# Patient Record
Sex: FEMALE | Race: WHITE | NOT HISPANIC OR LATINO | Employment: UNEMPLOYED | URBAN - METROPOLITAN AREA
[De-identification: names, ages, dates, MRNs, and addresses within clinical notes are randomized per-mention and may not be internally consistent; named-entity substitution may affect disease eponyms.]

---

## 2017-04-17 ENCOUNTER — OFFICE VISIT (OUTPATIENT)
Dept: URGENT CARE | Facility: CLINIC | Age: 57
End: 2017-04-17
Payer: COMMERCIAL

## 2017-04-17 VITALS
BODY MASS INDEX: 39.27 KG/M2 | HEIGHT: 64 IN | RESPIRATION RATE: 14 BRPM | TEMPERATURE: 98.8 F | DIASTOLIC BLOOD PRESSURE: 74 MMHG | WEIGHT: 230 LBS | SYSTOLIC BLOOD PRESSURE: 124 MMHG | OXYGEN SATURATION: 97 % | HEART RATE: 86 BPM

## 2017-04-17 DIAGNOSIS — R05.9 COUGH: ICD-10-CM

## 2017-04-17 DIAGNOSIS — J20.9 ACUTE BRONCHITIS, UNSPECIFIED ORGANISM: ICD-10-CM

## 2017-04-17 DIAGNOSIS — J01.40 ACUTE PANSINUSITIS, RECURRENCE NOT SPECIFIED: ICD-10-CM

## 2017-04-17 PROCEDURE — 94640 AIRWAY INHALATION TREATMENT: CPT | Performed by: NURSE PRACTITIONER

## 2017-04-17 PROCEDURE — 99214 OFFICE O/P EST MOD 30 MIN: CPT | Mod: 25 | Performed by: NURSE PRACTITIONER

## 2017-04-17 RX ORDER — BENZONATATE 100 MG/1
100 CAPSULE ORAL 3 TIMES DAILY PRN
Qty: 30 CAP | Refills: 0 | Status: SHIPPED | OUTPATIENT
Start: 2017-04-17 | End: 2019-11-16 | Stop reason: SDUPTHER

## 2017-04-17 RX ORDER — IPRATROPIUM BROMIDE AND ALBUTEROL SULFATE 2.5; .5 MG/3ML; MG/3ML
3 SOLUTION RESPIRATORY (INHALATION) ONCE
Status: COMPLETED | OUTPATIENT
Start: 2017-04-17 | End: 2017-04-17

## 2017-04-17 RX ORDER — ALBUTEROL SULFATE 2.5 MG/3ML
2.5 SOLUTION RESPIRATORY (INHALATION) EVERY 4 HOURS PRN
Qty: 75 ML | Refills: 0 | Status: SHIPPED | OUTPATIENT
Start: 2017-04-17

## 2017-04-17 RX ORDER — ALBUTEROL SULFATE 90 UG/1
2 AEROSOL, METERED RESPIRATORY (INHALATION) EVERY 6 HOURS PRN
Qty: 8.5 G | Refills: 0 | Status: SHIPPED | OUTPATIENT
Start: 2017-04-17

## 2017-04-17 RX ORDER — AZITHROMYCIN 250 MG/1
TABLET, FILM COATED ORAL
Qty: 6 TAB | Refills: 0 | Status: SHIPPED | OUTPATIENT
Start: 2017-04-17

## 2017-04-17 RX ADMIN — IPRATROPIUM BROMIDE AND ALBUTEROL SULFATE 3 ML: 2.5; .5 SOLUTION RESPIRATORY (INHALATION) at 12:45

## 2017-04-17 ASSESSMENT — ENCOUNTER SYMPTOMS
VOMITING: 0
MYALGIAS: 0
ORTHOPNEA: 0
PALPITATIONS: 0
SHORTNESS OF BREATH: 1
DIARRHEA: 0
CONSTIPATION: 0
CHILLS: 0
WEAKNESS: 0
EYE REDNESS: 0
NECK PAIN: 0
SENSORY CHANGE: 0
SORE THROAT: 0
NAUSEA: 0
HEADACHES: 0
BACK PAIN: 0
WHEEZING: 0
EYE DISCHARGE: 0
TINGLING: 0
SPUTUM PRODUCTION: 0
FEVER: 0
DIZZINESS: 0
ABDOMINAL PAIN: 0
COUGH: 1

## 2017-04-17 NOTE — PROGRESS NOTES
"Subjective:      Marina Medina is a 57 y.o. female who presents with URI            URI   Associated symptoms include congestion, coughing and ear pain. Pertinent negatives include no abdominal pain, chest pain, diarrhea, headaches, nausea, neck pain, sore throat, vomiting or wheezing.   Marina is a 57 year old female who is here for sinus problems and cough x 2 months. C/o yellow nasal d/c, ear pressure, low grade fever. Using flonase 2x/day, no saline rinsing, allegra use. C/o dry scratchy throat, cough dry hacking but productive intermittent, no SOB but slight chest tightness/minimal wheeze, states asthma history. Using Nyquil. Smoker. No cough syrup use. Albuterol inhaler at home 1x/day, not \"really having to use it\", has nebulizer but has not used, no meds for it. Will get intermittent dizziness, runny nose and PND.    PMH:  has a past medical history of Diabetes; ASTHMA; Hypertension; Other and unspecified hyperlipidemia (3/31/2014); Essential hypertension, benign (3/31/2014); and Gastroparesis (9/30/2015).  MEDS:   Current outpatient prescriptions:   •  albuterol 108 (90 BASE) MCG/ACT Aero Soln inhalation aerosol, Inhale 2 Puffs by mouth every 6 hours as needed for Shortness of Breath., Disp: 8.5 g, Rfl: 0  •  albuterol (PROVENTIL) 2.5mg/3ml Nebu Soln solution for nebulization, 3 mL by Nebulization route every four hours as needed for Shortness of Breath., Disp: 75 mL, Rfl: 0  •  azithromycin (ZITHROMAX) 250 MG Tab, Take 2 tabs by mouth on day 1, then take 1 tab on days 2-5, Disp: 6 Tab, Rfl: 0  •  benzonatate (TESSALON) 100 MG Cap, Take 1 Cap by mouth 3 times a day as needed for Cough., Disp: 30 Cap, Rfl: 0  •  Hydrocod Polst-CPM Polst ER (TUSSIONEX) 10-8 MG/5ML Suspension Extended Release, Take 5 mL by mouth every 12 hours as needed for Cough. Causes drowsiness, do not drive or work while using, Disp: 120 mL, Rfl: 0  •  triamterene-hctz (MAXZIDE-25/DYAZIDE) 37.5-25 MG Tab, Take 1 Tab by mouth every day., " "Disp: 90 Tab, Rfl: 2  •  metformin (GLUCOPHAGE) 500 MG Tab, Take 2 Tabs by mouth every day., Disp: 180 Tab, Rfl: 3  •  Canagliflozin (INVOKANA) 100 MG TABS, Take 1 Tab by mouth every day., Disp: 30 Tab, Rfl: 11  •  losartan (COZAAR) 100 MG TABS, Take 100 mg by mouth every day., Disp: , Rfl:   •  rosuvastatin (CRESTOR) 20 MG TABS, Take 20 mg by mouth every evening.  , Disp: , Rfl:   •  potassium chloride CR (K-DUR) 10 MEQ tablet, Take 10 mEq by mouth every day., Disp: , Rfl:   •  Esomeprazole Magnesium (NEXIUM) 20 MG PACK, Take 40 mg by mouth 2 Times a Day., Disp: , Rfl:   •  vitamin D (CHOLECALCIFEROL) 1000 UNIT TABS, Take 2,000 Units by mouth every day., Disp: , Rfl:   •  AMLODIPINE BESYLATE PO, Take 10 mg by mouth., Disp: , Rfl:   •  insulin detemir (LEVEMIR FLEXTOUCH) 100 UNIT/ML Solution Pen-injector injection, Inject 80 Units as instructed every evening., Disp: 30 mL, Rfl: 11  •  Saxagliptin-Metformin (KOMBIGLYZE XR) 5-1000 MG TB24, Take 1 Tab by mouth every day., Disp: 90 Tab, Rfl: 3  •  glimepiride (AMARYL) 4 MG TABS, Take 1 Tab by mouth 2 times a day., Disp: 180 Tab, Rfl: 3  •  glucose blood (FREESTYLE LITE) strip, Testing blood sugar 3 times per day., Disp: 100 Strip, Rfl: 6  •  INJECTION DEVICE FOR INSULIN (PEN NEEDLES 31GX5/16\") MISC, 1 Each by Does not apply route every bedtime., Disp: 100 Each, Rfl: 3  •  metoclopramide (REGLAN) 10 MG TABS, Take 10 mg by mouth 4 times a day. 1 tablet before meals and at bedtime , Disp: , Rfl:   •  Choline Fenofibrate (TRILIPIX) 135 MG CPDR, Take  by mouth.  , Disp: , Rfl:   •  albuterol (VENTOLIN OR PROVENTIL) 108 (90 BASE) MCG/ACT AERS, Inhale 2 Puffs by mouth every 6 hours as needed.  , Disp: , Rfl:   •  Pseudoeph-Doxylamine-DM-APAP (NYQUIL PO), Take  by mouth., Disp: , Rfl:   ALLERGIES:   Allergies   Allergen Reactions   • Asa [Aspirin] Hives   • Lovaza [Omega-3-Acid Ethyl Esters] Hives     SURGHX:   Past Surgical History   Procedure Laterality Date   • Other " "orthopedic surgery     • Gyn surgery       SOCHX:  reports that she has been smoking.  She has never used smokeless tobacco. She reports that she drinks alcohol. She reports that she does not use illicit drugs.  FH: Family history was reviewed, no pertinent findings to report      Review of Systems   Constitutional: Negative for fever, chills and malaise/fatigue.   HENT: Positive for congestion and ear pain. Negative for sore throat.    Eyes: Negative for discharge and redness.   Respiratory: Positive for cough and shortness of breath. Negative for sputum production and wheezing.    Cardiovascular: Negative for chest pain, palpitations and orthopnea.   Gastrointestinal: Negative for nausea, vomiting, abdominal pain, diarrhea and constipation.   Musculoskeletal: Negative for myalgias, back pain and neck pain.   Neurological: Negative for dizziness, tingling, sensory change, weakness and headaches.   Endo/Heme/Allergies: Positive for environmental allergies.   All other systems reviewed and are negative.         Objective:     /74 mmHg  Pulse 86  Temp(Src) 37.1 °C (98.8 °F)  Resp 14  Ht 1.626 m (5' 4\")  Wt 104.327 kg (230 lb)  BMI 39.46 kg/m2  SpO2 97%     Physical Exam   Constitutional: She is oriented to person, place, and time. Vital signs are normal. She appears well-developed and well-nourished.  Non-toxic appearance. She does not have a sickly appearance. She appears ill. No distress.   HENT:   Head: Normocephalic.   Right Ear: External ear and ear canal normal. Tympanic membrane is not bulging. A middle ear effusion is present.   Left Ear: External ear and ear canal normal. Tympanic membrane is not bulging. A middle ear effusion is present.   Nose: Mucosal edema and sinus tenderness present. No rhinorrhea. Right sinus exhibits maxillary sinus tenderness and frontal sinus tenderness. Left sinus exhibits maxillary sinus tenderness and frontal sinus tenderness.   Mouth/Throat: Uvula is midline. Mucous " membranes are dry. No uvula swelling. Posterior oropharyngeal erythema present. No posterior oropharyngeal edema.   Eyes: Conjunctivae and EOM are normal. Pupils are equal, round, and reactive to light.   Neck: Normal range of motion. Neck supple.   Cardiovascular: Normal rate and regular rhythm.    Pulmonary/Chest: Effort normal and breath sounds normal. No accessory muscle usage. No respiratory distress. She has no decreased breath sounds. She has no wheezes. She has no rhonchi. She has no rales.   Musculoskeletal: Normal range of motion.   Lymphadenopathy:     She has no cervical adenopathy.   Neurological: She is alert and oriented to person, place, and time.   Skin: Skin is warm and dry. She is not diaphoretic.   Vitals reviewed.  Duo Neb breathing treatment: Patient has chest tightness, wheeze without SOB or CP. Cough induced especially with deep breathing. After, patient states able to breathe deep without coughing. Air movement throughout. Post tx%.    [employer] web site evaluation: I have obtained and reviewed patient utilization report from [facility] pharmacy database prior to writing prescription for controlled substance II, III or IV. Based on the report and my clinical assessment the prescription is medically necessary, last narcotic fill on 2/20/17 for Tussionex 120 ml.          Assessment/Plan:     1. Cough    - albuterol 108 (90 BASE) MCG/ACT Aero Soln inhalation aerosol; Inhale 2 Puffs by mouth every 6 hours as needed for Shortness of Breath.  Dispense: 8.5 g; Refill: 0  - albuterol (PROVENTIL) 2.5mg/3ml Nebu Soln solution for nebulization; 3 mL by Nebulization route every four hours as needed for Shortness of Breath.  Dispense: 75 mL; Refill: 0  - benzonatate (TESSALON) 100 MG Cap; Take 1 Cap by mouth 3 times a day as needed for Cough.  Dispense: 30 Cap; Refill: 0  - Hydrocod Polst-CPM Polst ER (TUSSIONEX) 10-8 MG/5ML Suspension Extended Release; Take 5 mL by mouth every 12 hours as needed for  Cough. Causes drowsiness, do not drive or work while using  Dispense: 120 mL; Refill: 0  - ipratropium-albuterol (DUONEB) nebulizer solution 3 mL; 3 mL by Nebulization route Once.    2. Acute bronchitis, unspecified organism    - albuterol 108 (90 BASE) MCG/ACT Aero Soln inhalation aerosol; Inhale 2 Puffs by mouth every 6 hours as needed for Shortness of Breath.  Dispense: 8.5 g; Refill: 0  - albuterol (PROVENTIL) 2.5mg/3ml Nebu Soln solution for nebulization; 3 mL by Nebulization route every four hours as needed for Shortness of Breath.  Dispense: 75 mL; Refill: 0  - azithromycin (ZITHROMAX) 250 MG Tab; Take 2 tabs by mouth on day 1, then take 1 tab on days 2-5  Dispense: 6 Tab; Refill: 0  - benzonatate (TESSALON) 100 MG Cap; Take 1 Cap by mouth 3 times a day as needed for Cough.  Dispense: 30 Cap; Refill: 0  - ipratropium-albuterol (DUONEB) nebulizer solution 3 mL; 3 mL by Nebulization route Once.    3. Acute pansinusitis, recurrence not specified    - azithromycin (ZITHROMAX) 250 MG Tab; Take 2 tabs by mouth on day 1, then take 1 tab on days 2-5  Dispense: 6 Tab; Refill: 0    Increase water intake  Get rest  May use Ibuprofen/Tylenol prn for any fever, body aches or throat pain  May take longer acting antihistamine for seasonal allergy symptoms prn  May use saline nasal spray for nasal congestion  May use Flonase or Nasocort for allergen nasal congestion  May use Mucinex prn for increased productive cough   May gargle with salt water prn for throat discomfort  May drink smoothies for nutrition if too painful to swallow solid foods  Monitor for productive cough, SOB and chest pain/tightness- need re-evaluation

## 2018-10-28 NOTE — MR AVS SNAPSHOT
"        Marina Medina   2017 12:15 PM   Office Visit   MRN: [id number]    Department:  [facility]   Dept Phone:  [phone]    Description:  Female : 1960   Provider:  PREET Tracey           Reason for Visit     URI uri , sinus congestion , l ear ache  , dizziness x 2 weeks .      Allergies as of 2017     Allergen Noted Reactions    Asa [Aspirin] 10/17/2009   Hives    Lovaza [Omega-3-Acid Ethyl Esters] 2014   Hives      You were diagnosed with     Cough   [786.2.ICD-9-CM]       Acute bronchitis, unspecified organism   [2055336]       Acute pansinusitis, recurrence not specified   [4112937]         Vital Signs     Blood Pressure Pulse Temperature Respirations Height Weight    124/74 mmHg 86 37.1 °C (98.8 °F) 14 1.626 m (5' 4\") 104.327 kg (230 lb)    Body Mass Index Oxygen Saturation Smoking Status             39.46 kg/m2 97% Current Every Day Smoker         Basic Information     Date Of Birth Sex Race Ethnicity Preferred Language    1960 Female White Non- English      Problem List              ICD-10-CM Priority Class Noted - Resolved    Type II or unspecified type diabetes mellitus without mention of complication, uncontrolled E11.65   3/31/2014 - Present    Other and unspecified hyperlipidemia E78.5   3/31/2014 - Present    Essential hypertension, benign I10   3/31/2014 - Present    Encounter for long-term (current) use of insulin (CMS-[facility]) Z79.4   10/1/2014 - Present    Insomnia G47.00   10/1/2014 - Present    Gastroparesis K31.84   2015 - Present      Health Maintenance        Date Due Completion Dates    IMM HEP B VACCINE (1 of 3 - Primary Series) 1960 ---    URINE ACR / MICROALBUMIN 1979 ---    IMM DTaP/Tdap/Td Vaccine (1 - Tdap) 1980 ---    IMM PNEUMOCOCCAL 19-64 (ADULT) MEDIUM RISK SERIES (1 of 1 - PPSV23) 1980 ---    PAP SMEAR 1982 ---    MAMMOGRAM 2001 ---    COLONOSCOPY 2011 ---    A1C SCREENING 2016 " 7/14/2015, 2/18/2015, 8/18/2014, 3/31/2014, 4/24/2013, 10/17/2009    RETINAL SCREENING 5/28/2016 5/28/2015    FASTING LIPID PROFILE 7/14/2016 7/14/2015, 2/18/2015, 8/18/2014, 4/24/2013, 10/17/2009    SERUM CREATININE 7/14/2016 7/14/2015, 2/18/2015, 8/18/2014, 4/24/2013, 10/17/2009, 11/27/2006, 1/11/2005, 1/10/2005    DIABETES MONOFILAMENT / LE EXAM 9/30/2016 9/30/2015 (Done), 4/1/2015 (Done), 3/31/2014, 3/31/2014 (Done)    Override on 9/30/2015: Done    Override on 4/1/2015: Done    Override on 3/31/2014: Done            Current Immunizations     Influenza Vaccine Quad Inj (Pf) 10/1/2014      Below and/or attached are the medications your provider expects you to take. Review all of your home medications and newly ordered medications with your provider and/or pharmacist. Follow medication instructions as directed by your provider and/or pharmacist. Please keep your medication list with you and share with your provider. Update the information when medications are discontinued, doses are changed, or new medications (including over-the-counter products) are added; and carry medication information at all times in the event of emergency situations     Allergies:  ASA - Hives     LOVAZA - Hives               Medications  Valid as of: April 17, 2017 -  1:08 PM    Generic Name Brand Name Tablet Size Instructions for use    Albuterol Sulfate (Aero Soln) albuterol 108 (90 BASE) MCG/ACT Inhale 2 Puffs by mouth every 6 hours as needed.          Albuterol Sulfate (Aero Soln) albuterol 108 (90 BASE) MCG/ACT Inhale 2 Puffs by mouth every 6 hours as needed for Shortness of Breath.        Albuterol Sulfate (Nebu Soln) PROVENTIL 2.5mg/3ml 3 mL by Nebulization route every four hours as needed for Shortness of Breath.        AmLODIPine Besylate   Take 10 mg by mouth.        Azithromycin (Tab) ZITHROMAX 250 MG Take 2 tabs by mouth on day 1, then take 1 tab on days 2-5        Benzonatate (Cap) TESSALON 100 MG Take 1 Cap by mouth 3 times a  day as needed for Cough.        Canagliflozin (Tab) Canagliflozin 100 MG Take 1 Tab by mouth every day.        Cholecalciferol (Tab) cholecalciferol 1000 UNIT Take 2,000 Units by mouth every day.        Choline Fenofibrate (CAPSULE DELAYED RELEASE) TRILIPIX 135 MG Take  by mouth.          Esomeprazole Magnesium (Pack) Esomeprazole Magnesium 20 MG Take 40 mg by mouth 2 Times a Day.        Glimepiride (Tab) AMARYL 4 MG Take 1 Tab by mouth 2 times a day.        Glucose Blood (Strip) glucose blood  Testing blood sugar 3 times per day.        Hydrocod Polst-Chlorphen Polst (Suspension Extended Release) TUSSIONEX 10-8 MG/5ML Take 5 mL by mouth every 12 hours as needed for Cough. Causes drowsiness, do not drive or work while using        Injection Device for Insulin (Misc) INJECTION DEVICE FOR INSULIN  1 Each by Does not apply route every bedtime.        Insulin Detemir (Solution Pen-injector) LEVEMIR 100 UNIT/ML Inject 80 Units as instructed every evening.        Losartan Potassium (Tab) COZAAR 100 MG Take 100 mg by mouth every day.        MetFORMIN HCl (Tab) GLUCOPHAGE 500 MG Take 2 Tabs by mouth every day.        Metoclopramide HCl (Tab) REGLAN 10 MG Take 10 mg by mouth 4 times a day. 1 tablet before meals and at bedtime         Potassium Chloride (Tab CR) KLOR-CON 10 MEQ Take 10 mEq by mouth every day.        Pseudoeph-Doxylamine-DM-APAP   Take  by mouth.        Rosuvastatin Calcium (Tab) CRESTOR 20 MG Take 20 mg by mouth every evening.          SAXagliptin-MetFORMIN (TABLET SR 24 HR) Saxagliptin-Metformin 5-1000 MG Take 1 Tab by mouth every day.        Triamterene-HCTZ (Tab) MAXZIDE-25/DYAZIDE 37.5-25 MG Take 1 Tab by mouth every day.        .                 Medicines prescribed today were sent to:     [facility]/PHARM[facility]Y #[id number] - [place] - [address]    [address] [place] [address]    Phone: [phone] Fax: [phone]    Open 24 Hours?: No      Medication refill instructions:       If your prescription  bottle indicates you have medication refills left, it is not necessary to call your provider’s office. Please contact your pharmacy and they will refill your medication.    If your prescription bottle indicates you do not have any refills left, you may request refills at any time through one of the following ways: The online [employer] system (except Urgent Care), by calling your provider’s office, or by asking your pharmacy to contact your provider’s office with a refill request. Medication refills are processed only during regular business hours and may not be available until the next business day. Your provider may request additional information or to have a follow-up visit with you prior to refilling your medication.   *Please Note: Medication refills are assigned a new Rx number when refilled electronically. Your pharmacy may indicate that no refills were authorized even though a new prescription for the same medication is available at the pharmacy. Please request the medicine by name with the pharmacy before contacting your provider for a refill.           [employer] Access Code: Activation code not generated  Current [employer] Status: Active          Quit Tobacco Information     Do you want to quit using tobacco?    Quitting tobacco decreases risks of cancer, heart and lung disease, increases life expectancy, improves sense of taste and smell, and increases spending money, among other benefits.    If you are thinking about quitting, we can help.  • [facility] Quit Tobacco Program: [phone]  o Program occurs weekly for four weeks and includes pharmacist consultation on products to support quitting smoking or chewing tobacco. A provider referral is needed for pharmacist consultation.  • Tobacco Users Help Hotline: 7-537-QUIT-NOW (127-1225) or https://[place].quitlogix.org/  o Free, confidential telephone and online coaching for [place] residents. Sessions are designed on a schedule that is convenient for you. Eligible  clients receive free nicotine replacement therapy.  • Nationally: www.smokefree.gov  o Information and professional assistance to support both immediate and long-term needs as you become, and remain, a non-smoker. Smokefree.gov allows you to choose the help that best fits your needs.         normal...

## 2018-11-28 ENCOUNTER — OFFICE VISIT (OUTPATIENT)
Dept: URGENT CARE | Facility: CLINIC | Age: 58
End: 2018-11-28
Payer: COMMERCIAL

## 2018-11-28 VITALS
HEIGHT: 64 IN | SYSTOLIC BLOOD PRESSURE: 132 MMHG | HEART RATE: 74 BPM | OXYGEN SATURATION: 96 % | WEIGHT: 230 LBS | TEMPERATURE: 97.3 F | DIASTOLIC BLOOD PRESSURE: 84 MMHG | BODY MASS INDEX: 39.27 KG/M2 | RESPIRATION RATE: 13 BRPM

## 2018-11-28 DIAGNOSIS — D49.2 ABNORMAL SKIN GROWTH: ICD-10-CM

## 2018-11-28 DIAGNOSIS — L08.9 SKIN INFECTION: ICD-10-CM

## 2018-11-28 DIAGNOSIS — J40 BRONCHITIS: ICD-10-CM

## 2018-11-28 PROCEDURE — 99214 OFFICE O/P EST MOD 30 MIN: CPT | Performed by: PHYSICIAN ASSISTANT

## 2018-11-28 RX ORDER — LISINOPRIL 10 MG/1
10 TABLET ORAL DAILY
COMMUNITY

## 2018-11-28 RX ORDER — TRAZODONE HYDROCHLORIDE 100 MG/1
100 TABLET ORAL NIGHTLY
COMMUNITY

## 2018-11-28 RX ORDER — CODEINE PHOSPHATE AND GUAIFENESIN 10; 100 MG/5ML; MG/5ML
5 SOLUTION ORAL NIGHTLY PRN
Qty: 50 ML | Refills: 0 | Status: SHIPPED | OUTPATIENT
Start: 2018-11-28 | End: 2018-12-08

## 2018-11-28 RX ORDER — DOXYCYCLINE 100 MG/1
100 CAPSULE ORAL 2 TIMES DAILY
Qty: 10 CAP | Refills: 0 | Status: SHIPPED | OUTPATIENT
Start: 2018-11-28 | End: 2018-12-03

## 2018-11-29 ASSESSMENT — ENCOUNTER SYMPTOMS
SHORTNESS OF BREATH: 0
WHEEZING: 0
HEADACHES: 0
CHILLS: 0
RHINORRHEA: 1
DIZZINESS: 0
VOMITING: 0
FEVER: 0
TINGLING: 0
DIARRHEA: 0
MYALGIAS: 0
EYE REDNESS: 0
EYE DISCHARGE: 0
COUGH: 1
SPUTUM PRODUCTION: 1
NECK PAIN: 0
SORE THROAT: 1

## 2018-11-29 NOTE — PROGRESS NOTES
"Subjective:      Marina Medina is a 58 y.o. female who presents with Nodule (vaginal area) and Cough            Patient is a 58-year-old female who presents to urgent care with a recent cough and congestion over the last few weeks.  Patient also would like for me to look at to skin lesions near her vagina one has progressively become more painful and she believes may be an infection.  The other skin lesion is a \"skin tag\" of which her primary care provider per patient is too large for him to remove.  Patient has long history of same in the past with other \"skin tags\" of which have been removed without difficulty.  The sore lesion developed a few days ago of which she is concerned about a possible boil.  Patient reports that it is very painful when she wipes as well however she is uncertain if the area has been draining although does feel wet at times.      Cough   This is a new problem. The current episode started 1 to 4 weeks ago. The problem has been unchanged. The problem occurs every few minutes. The cough is productive of sputum. Associated symptoms include ear congestion, postnasal drip, rhinorrhea and a sore throat. Pertinent negatives include no chest pain, chills, ear pain, eye redness, fever, headaches, myalgias, rash, shortness of breath or wheezing. Nothing aggravates the symptoms. She has tried OTC cough suppressant for the symptoms. The treatment provided mild relief. Her past medical history is significant for bronchitis.       Review of Systems   Constitutional: Positive for malaise/fatigue. Negative for chills and fever.   HENT: Positive for congestion, postnasal drip, rhinorrhea and sore throat. Negative for ear discharge and ear pain.    Eyes: Negative for discharge and redness.   Respiratory: Positive for cough and sputum production. Negative for shortness of breath and wheezing.    Cardiovascular: Negative for chest pain.   Gastrointestinal: Negative for diarrhea and vomiting. " "  Genitourinary: Negative for dysuria and urgency.   Musculoskeletal: Negative for myalgias and neck pain.   Skin: Negative for itching and rash.   Neurological: Negative for dizziness, tingling and headaches.   All other systems reviewed and are negative.         Objective:     /84 (BP Location: Right arm, Patient Position: Sitting, BP Cuff Size: Adult)   Pulse 74   Temp 36.3 °C (97.3 °F) (Temporal)   Resp 13   Ht 1.626 m (5' 4\")   Wt 104.3 kg (230 lb)   SpO2 96%   BMI 39.48 kg/m²    PMH:  has a past medical history of ASTHMA; Diabetes; Essential hypertension, benign (3/31/2014); Gastroparesis (9/30/2015); Hypertension; and Other and unspecified hyperlipidemia (3/31/2014).  MEDS:   Current Outpatient Prescriptions:   •  lisinopril (PRINIVIL) 10 MG Tab, Take 10 mg by mouth every day., Disp: , Rfl:   •  traZODone (DESYREL) 100 MG Tab, Take 100 mg by mouth every evening., Disp: , Rfl:   •  doxycycline (MONODOX) 100 MG capsule, Take 1 Cap by mouth 2 times a day for 5 days., Disp: 10 Cap, Rfl: 0  •  guaifenesin-codeine (ROBITUSSIN AC) Solution oral solution, Take 5 mL by mouth at bedtime as needed for Cough (May cause sedation) for up to 10 days., Disp: 50 mL, Rfl: 0  •  albuterol (PROVENTIL) 2.5mg/3ml Nebu Soln solution for nebulization, 3 mL by Nebulization route every four hours as needed for Shortness of Breath., Disp: 75 mL, Rfl: 0  •  metformin (GLUCOPHAGE) 500 MG Tab, Take 2 Tabs by mouth every day., Disp: 180 Tab, Rfl: 3  •  glimepiride (AMARYL) 4 MG TABS, Take 1 Tab by mouth 2 times a day., Disp: 180 Tab, Rfl: 3  •  glucose blood (FREESTYLE LITE) strip, Testing blood sugar 3 times per day., Disp: 100 Strip, Rfl: 6  •  Canagliflozin (INVOKANA) 100 MG TABS, Take 1 Tab by mouth every day., Disp: 30 Tab, Rfl: 11  •  losartan (COZAAR) 100 MG TABS, Take 100 mg by mouth every day., Disp: , Rfl:   •  rosuvastatin (CRESTOR) 20 MG TABS, Take 20 mg by mouth every evening.  , Disp: , Rfl:   •  albuterol 108 " "(90 BASE) MCG/ACT Aero Soln inhalation aerosol, Inhale 2 Puffs by mouth every 6 hours as needed for Shortness of Breath., Disp: 8.5 g, Rfl: 0  •  azithromycin (ZITHROMAX) 250 MG Tab, Take 2 tabs by mouth on day 1, then take 1 tab on days 2-5, Disp: 6 Tab, Rfl: 0  •  benzonatate (TESSALON) 100 MG Cap, Take 1 Cap by mouth 3 times a day as needed for Cough., Disp: 30 Cap, Rfl: 0  •  Hydrocod Polst-CPM Polst ER (TUSSIONEX) 10-8 MG/5ML Suspension Extended Release, Take 5 mL by mouth every 12 hours as needed for Cough. Causes drowsiness, do not drive or work while using, Disp: 120 mL, Rfl: 0  •  insulin detemir (LEVEMIR FLEXTOUCH) 100 UNIT/ML Solution Pen-injector injection, Inject 80 Units as instructed every evening., Disp: 30 mL, Rfl: 11  •  triamterene-hctz (MAXZIDE-25/DYAZIDE) 37.5-25 MG Tab, Take 1 Tab by mouth every day., Disp: 90 Tab, Rfl: 2  •  Saxagliptin-Metformin (KOMBIGLYZE XR) 5-1000 MG TB24, Take 1 Tab by mouth every day., Disp: 90 Tab, Rfl: 3  •  INJECTION DEVICE FOR INSULIN (PEN NEEDLES 31GX5/16\") MISC, 1 Each by Does not apply route every bedtime., Disp: 100 Each, Rfl: 3  •  metoclopramide (REGLAN) 10 MG TABS, Take 10 mg by mouth 4 times a day. 1 tablet before meals and at bedtime , Disp: , Rfl:   •  potassium chloride CR (K-DUR) 10 MEQ tablet, Take 10 mEq by mouth every day., Disp: , Rfl:   •  Esomeprazole Magnesium (NEXIUM) 20 MG PACK, Take 40 mg by mouth 2 Times a Day., Disp: , Rfl:   •  Choline Fenofibrate (TRILIPIX) 135 MG CPDR, Take  by mouth.  , Disp: , Rfl:   •  vitamin D (CHOLECALCIFEROL) 1000 UNIT TABS, Take 2,000 Units by mouth every day., Disp: , Rfl:   •  albuterol (VENTOLIN OR PROVENTIL) 108 (90 BASE) MCG/ACT AERS, Inhale 2 Puffs by mouth every 6 hours as needed.  , Disp: , Rfl:   •  Pseudoeph-Doxylamine-DM-APAP (NYQUIL PO), Take  by mouth., Disp: , Rfl:   •  AMLODIPINE BESYLATE PO, Take 10 mg by mouth., Disp: , Rfl:   ALLERGIES:   Allergies   Allergen Reactions   • Asa [Aspirin] Hives   • " Lovaza [Omega-3-Acid Ethyl Esters] Hives     SURGHX:   Past Surgical History:   Procedure Laterality Date   • GYN SURGERY     • OTHER ORTHOPEDIC SURGERY       SOCHX:  reports that she has been smoking.  She has been smoking about 1.00 pack per day. She has never used smokeless tobacco. She reports that she drinks alcohol. She reports that she does not use drugs.  FH: Family history was reviewed, no pertinent findings to report    Physical Exam   Constitutional: She is oriented to person, place, and time. She appears well-developed and well-nourished.   HENT:   Head: Normocephalic and atraumatic.   Mouth/Throat: No oropharyngeal exudate.   Ears- Canals clear- TM- with clear fluid effusions bilaterally.   Pos. PND, with slight erythema- without tonsillar edema or exudate.   Mild discharge noted bilaterally- to nares.      Eyes: Pupils are equal, round, and reactive to light. EOM are normal.   Neck: Normal range of motion. Neck supple.   Cardiovascular: Normal rate and regular rhythm.    No murmur heard.  Pulmonary/Chest: Effort normal and breath sounds normal. No respiratory distress.   Genitourinary:         Genitourinary Comments: Large verruca noted to the left of the labia with large base- non-tender.   Left labia- small indurated area- without any evidence of fluctuance, with tiny superificial open wound- tenderness with palpation. Without discrete papule or pustule, Without evidence of abscess formation.    Musculoskeletal: Normal range of motion. She exhibits no tenderness.   Lymphadenopathy:     She has no cervical adenopathy.   Neurological: She is alert and oriented to person, place, and time.   Skin: Skin is warm. No rash noted.   Psychiatric: She has a normal mood and affect. Her behavior is normal.   Vitals reviewed.              Assessment/Plan:     1. Skin infection  - doxycycline (MONODOX) 100 MG capsule; Take 1 Cap by mouth 2 times a day for 5 days.  Dispense: 10 Cap; Refill: 0    2. Bronchitis  -  "guaifenesin-codeine (ROBITUSSIN AC) Solution oral solution; Take 5 mL by mouth at bedtime as needed for Cough (May cause sedation) for up to 10 days.  Dispense: 50 mL; Refill: 0    3. Abnormal skin growth  - REFERRAL TO OB/GYN    JAYNE was reviewed by myself-  Document does not reveal any concerning patterns. Pt. was advised to avoid the operation of heavy machine along with driving while on such medications. Finally pt. was advised to use medication only as prescribed.   Verruca- concerning for HPV at this time- over \"skin tag\"- base is very large and I do not feel comfortable removing such at the request of the patient- referral was made today to OB/GYN.  Also area near the left labia- maybe early abscess formation- however is very small and without any evidence of fluctuance- no drainage.   Will start the patient on Doxycycline as this would not only provide ABX coverage for Bacterial bronchitis- but for skin infection as well.   Patient given precautionary s/sx that mandate immediate follow up and evaluation in the ED. Advised of risks of not doing so.    DDX, Supportive care, and indications for immediate follow-up discussed with patient.    Instructed to return to clinic or nearest emergency department if we are not available for any change in condition, further concerns, or worsening of symptoms.    The patient demonstrated a good understanding and agreed with the treatment plan.  Please note that this dictation was created using voice recognition software. I have made every reasonable attempt to correct obvious errors, but I expect that there are errors of grammar and possibly content that I did not discover before finalizing the note.    "

## 2019-11-16 ENCOUNTER — OFFICE VISIT (OUTPATIENT)
Dept: URGENT CARE | Facility: PHYSICIAN GROUP | Age: 59
End: 2019-11-16
Payer: COMMERCIAL

## 2019-11-16 VITALS
BODY MASS INDEX: 41.32 KG/M2 | TEMPERATURE: 97.8 F | HEIGHT: 64 IN | WEIGHT: 242 LBS | HEART RATE: 78 BPM | SYSTOLIC BLOOD PRESSURE: 152 MMHG | DIASTOLIC BLOOD PRESSURE: 80 MMHG | OXYGEN SATURATION: 95 %

## 2019-11-16 DIAGNOSIS — J20.9 ACUTE BRONCHITIS, UNSPECIFIED ORGANISM: ICD-10-CM

## 2019-11-16 DIAGNOSIS — R05.9 COUGH: ICD-10-CM

## 2019-11-16 DIAGNOSIS — H66.90 ACUTE OTITIS MEDIA, UNSPECIFIED OTITIS MEDIA TYPE: ICD-10-CM

## 2019-11-16 DIAGNOSIS — J40 BRONCHITIS: ICD-10-CM

## 2019-11-16 DIAGNOSIS — J01.01 ACUTE RECURRENT MAXILLARY SINUSITIS: ICD-10-CM

## 2019-11-16 PROCEDURE — 99214 OFFICE O/P EST MOD 30 MIN: CPT | Performed by: FAMILY MEDICINE

## 2019-11-16 RX ORDER — FLUTICASONE PROPIONATE 50 MCG
1 SPRAY, SUSPENSION (ML) NASAL DAILY
COMMUNITY
End: 2019-11-16 | Stop reason: SDUPTHER

## 2019-11-16 RX ORDER — FLUTICASONE PROPIONATE 50 MCG
1 SPRAY, SUSPENSION (ML) NASAL DAILY
Qty: 1 BOTTLE | Refills: 1 | Status: SHIPPED | OUTPATIENT
Start: 2019-11-16

## 2019-11-16 RX ORDER — BENZONATATE 100 MG/1
100 CAPSULE ORAL 3 TIMES DAILY PRN
Qty: 30 CAP | Refills: 0 | Status: SHIPPED | OUTPATIENT
Start: 2019-11-16

## 2019-11-16 RX ORDER — FENOFIBRATE 145 MG/1
145 TABLET, COATED ORAL DAILY
COMMUNITY

## 2019-11-16 RX ORDER — FLUOXETINE HYDROCHLORIDE 20 MG/1
20 CAPSULE ORAL DAILY
COMMUNITY

## 2019-11-16 RX ORDER — TELMISARTAN 40 MG/1
40 TABLET ORAL DAILY
COMMUNITY

## 2019-11-16 RX ORDER — AMOXICILLIN AND CLAVULANATE POTASSIUM 875; 125 MG/1; MG/1
1 TABLET, FILM COATED ORAL 2 TIMES DAILY
Qty: 20 TAB | Refills: 0 | Status: SHIPPED | OUTPATIENT
Start: 2019-11-16 | End: 2019-11-26

## 2019-11-16 ASSESSMENT — ENCOUNTER SYMPTOMS
EYE PAIN: 0
SORE THROAT: 1
FEVER: 0
VOMITING: 0
SINUS PRESSURE: 1
CHILLS: 0
SHORTNESS OF BREATH: 0
DIZZINESS: 0
COUGH: 1
MYALGIAS: 0
NAUSEA: 0

## 2019-11-16 NOTE — LETTER
November 16, 2019         Patient: Marina Medina   YOB: 1960   Date of Visit: 11/16/2019           To Whom it May Concern:    Marina Medina was seen in my clinic on 11/16/2019. She may return to household chores on 11/18/2019.    If you have any questions or concerns, please don't hesitate to call.        Sincerely,           Alec Lamb M.D.  Electronically Signed

## 2019-11-17 NOTE — PROGRESS NOTES
"Subjective:   Marina Medina is a 59 y.o. female who presents for Sinus Problem (pressure and pain x 2 weeks)        59-year-old female with a history of diabetes mellitus presents to the urgent care with a chief complaint of sinus pain and pressure for the past 2 weeks.  The patient is also experienced bilateral ear pain and lightheadedness with ambulation.  Patient complains of low-grade subjective fever and intermittent cough with deep inspiration.    Sinus Problem   This is a new problem. Episode onset: 2 weeks worsening now. The problem has been gradually worsening since onset. Associated symptoms include congestion, coughing, sinus pressure and a sore throat. Pertinent negatives include no chills or shortness of breath. Past treatments include oral decongestants. The treatment provided no relief.     Review of Systems   Constitutional: Negative for chills and fever.   HENT: Positive for congestion, sinus pressure and sore throat.    Eyes: Negative for pain.   Respiratory: Positive for cough. Negative for shortness of breath.    Cardiovascular: Negative for chest pain.   Gastrointestinal: Negative for nausea and vomiting.   Genitourinary: Negative for hematuria.   Musculoskeletal: Negative for myalgias.   Skin: Negative for rash.   Neurological: Negative for dizziness.     Allergies   Allergen Reactions   • Asa [Aspirin] Hives   • Lovaza [Omega-3-Acid Ethyl Esters] Hives      Objective:   /80   Pulse 78   Temp 36.6 °C (97.8 °F)   Ht 1.626 m (5' 4\")   Wt 109.8 kg (242 lb)   SpO2 95%   BMI 41.54 kg/m²   Physical Exam  Constitutional:       General: She is not in acute distress.     Appearance: She is well-developed.   HENT:      Head: Normocephalic and atraumatic.      Nose: Mucosal edema and rhinorrhea present.      Right Turbinates: Swollen.      Left Turbinates: Swollen.      Right Sinus: Maxillary sinus tenderness present.      Left Sinus: Maxillary sinus tenderness present.      Comments: " Turbinates edematous, purulent exudate noted     Mouth/Throat:      Pharynx: Posterior oropharyngeal erythema present.   Eyes:      Conjunctiva/sclera: Conjunctivae normal.      Pupils: Pupils are equal, round, and reactive to light.   Cardiovascular:      Rate and Rhythm: Normal rate and regular rhythm.      Heart sounds: No murmur.   Pulmonary:      Effort: Pulmonary effort is normal. No respiratory distress.      Breath sounds: Rhonchi present. No wheezing.   Abdominal:      General: There is no distension.      Palpations: Abdomen is soft.      Tenderness: There is no tenderness.   Musculoskeletal: Normal range of motion.   Skin:     General: Skin is warm and dry.   Neurological:      General: No focal deficit present.      Mental Status: She is alert and oriented to person, place, and time. Mental status is at baseline.      Sensory: Sensory deficit: 3.      Gait: Gait (gait at baseline) normal.   Psychiatric:         Judgment: Judgment normal.           Assessment/Plan:   1. Acute recurrent maxillary sinusitis    - amoxicillin-clavulanate (AUGMENTIN) 875-125 MG Tab; Take 1 Tab by mouth 2 times a day for 10 days.  Dispense: 20 Tab; Refill: 0  - fluticasone (FLONASE) 50 MCG/ACT nasal spray; Spray 1 Spray in nose every day.  Dispense: 1 Bottle; Refill: 1    2. Acute otitis media, unspecified otitis media type    - amoxicillin-clavulanate (AUGMENTIN) 875-125 MG Tab; Take 1 Tab by mouth 2 times a day for 10 days.  Dispense: 20 Tab; Refill: 0            3. Acute bronchitis, unspecified organism    - benzonatate (TESSALON) 100 MG Cap; Take 1 Cap by mouth 3 times a day as needed for Cough.  Dispense: 30 Cap; Refill: 0    Differential diagnosis, natural history, supportive care, and indications for immediate follow-up discussed.     Advised the patient to follow-up with the primary care physician for recheck, reevaluation, and consideration of further management.

## 2019-11-17 NOTE — PATIENT INSTRUCTIONS
Sinusitis, Adult  Sinusitis is soreness and inflammation of your sinuses. Sinuses are hollow spaces in the bones around your face. They are located:  · Around your eyes.  · In the middle of your forehead.  · Behind your nose.  · In your cheekbones.  Your sinuses and nasal passages are lined with a stringy fluid (mucus). Mucus normally drains out of your sinuses. When your nasal tissues get inflamed or swollen, the mucus can get trapped or blocked so air cannot flow through your sinuses. This lets bacteria, viruses, and funguses grow, and that leads to infection.  Follow these instructions at home:  Medicines  · Take, use, or apply over-the-counter and prescription medicines only as told by your doctor. These may include nasal sprays.  · If you were prescribed an antibiotic medicine, take it as told by your doctor. Do not stop taking the antibiotic even if you start to feel better.  Hydrate and Humidify  · Drink enough water to keep your pee (urine) clear or pale yellow.  · Use a cool mist humidifier to keep the humidity level in your home above 50%.  · Breathe in steam for 10-15 minutes, 3-4 times a day or as told by your doctor. You can do this in the bathroom while a hot shower is running.  · Try not to spend time in cool or dry air.  Rest  · Rest as much as possible.  · Sleep with your head raised (elevated).  · Make sure to get enough sleep each night.  General instructions  · Put a warm, moist washcloth on your face 3-4 times a day or as told by your doctor. This will help with discomfort.  · Wash your hands often with soap and water. If there is no soap and water, use hand .  · Do not smoke. Avoid being around people who are smoking (secondhand smoke).  · Keep all follow-up visits as told by your doctor. This is important.  Contact a doctor if:  · You have a fever.  · Your symptoms get worse.  · Your symptoms do not get better within 10 days.  Get help right away if:  · You have a very bad  headache.  · You cannot stop throwing up (vomiting).  · You have pain or swelling around your face or eyes.  · You have trouble seeing.  · You feel confused.  · Your neck is stiff.  · You have trouble breathing.  This information is not intended to replace advice given to you by your health care provider. Make sure you discuss any questions you have with your health care provider.  Document Released: 06/05/2009 Document Revised: 08/13/2017 Document Reviewed: 10/12/2016  [employer] Interactive Patient Education © 2017 [employer]  Bronchitis  Bronchitis is a problem of the air tubes leading to your lungs. This problem makes it hard for air to get in and out of the lungs. You may cough a lot because your air tubes are narrow. Going without care can cause lasting (chronic) bronchitis.  HOME CARE   · Drink enough fluids to keep your pee (urine) clear or pale yellow.  · Use a cool mist humidifier.  · Quit smoking if you smoke. If you keep smoking, the bronchitis might not get better.  · Only take medicine as told by your doctor.  GET HELP RIGHT AWAY IF:   · Coughing keeps you awake.  · You start to wheeze.  · You become more sick or weak.  · You have a hard time breathing or get short of breath.  · You cough up blood.  · Coughing lasts more than 2 weeks.  · You have a fever.  · Your baby is older than 3 months with a rectal temperature of 102° F (38.9° C) or higher.  · Your baby is 3 months old or younger with a rectal temperature of 100.4° F (38° C) or higher.  MAKE SURE YOU:  · Understand these instructions.  · Will watch your condition.  · Will get help right away if you are not doing well or get worse.  Document Released: 06/05/2009 Document Revised: 03/11/2013 Document Reviewed: 11/19/2010  [employer]® Patient Information ©2014 [employer].

## 2019-12-30 ENCOUNTER — OFFICE VISIT (OUTPATIENT)
Dept: URGENT CARE | Facility: PHYSICIAN GROUP | Age: 59
End: 2019-12-30
Payer: COMMERCIAL

## 2019-12-30 VITALS
WEIGHT: 232 LBS | RESPIRATION RATE: 16 BRPM | TEMPERATURE: 98.6 F | HEART RATE: 74 BPM | SYSTOLIC BLOOD PRESSURE: 128 MMHG | HEIGHT: 64 IN | OXYGEN SATURATION: 98 % | BODY MASS INDEX: 39.61 KG/M2 | DIASTOLIC BLOOD PRESSURE: 78 MMHG

## 2019-12-30 DIAGNOSIS — J01.90 ACUTE NON-RECURRENT SINUSITIS, UNSPECIFIED LOCATION: ICD-10-CM

## 2019-12-30 DIAGNOSIS — S09.93XA TONGUE INJURY, INITIAL ENCOUNTER: ICD-10-CM

## 2019-12-30 DIAGNOSIS — K12.2 ORAL INFECTION: ICD-10-CM

## 2019-12-30 DIAGNOSIS — K14.6 TONGUE PAIN: ICD-10-CM

## 2019-12-30 PROCEDURE — 99214 OFFICE O/P EST MOD 30 MIN: CPT | Performed by: NURSE PRACTITIONER

## 2019-12-30 RX ORDER — AMOXICILLIN AND CLAVULANATE POTASSIUM 875; 125 MG/1; MG/1
1 TABLET, FILM COATED ORAL 2 TIMES DAILY
Qty: 14 TAB | Refills: 0 | Status: SHIPPED | OUTPATIENT
Start: 2019-12-30 | End: 2020-01-06

## 2019-12-30 RX ORDER — TRAMADOL HYDROCHLORIDE 50 MG/1
50 TABLET ORAL EVERY 8 HOURS PRN
Qty: 9 TAB | Refills: 0 | Status: SHIPPED | OUTPATIENT
Start: 2019-12-30 | End: 2020-01-02

## 2019-12-30 ASSESSMENT — ENCOUNTER SYMPTOMS
FEVER: 0
COUGH: 1
CARDIOVASCULAR NEGATIVE: 1
SORE THROAT: 1
SHORTNESS OF BREATH: 0
SINUS PAIN: 1
NEUROLOGICAL NEGATIVE: 1

## 2019-12-31 NOTE — PROGRESS NOTES
Subjective:     Marina Medina is a 59 y.o. female who presents for Sinus Pain (ear pain, sinus pain, bit tongue unsure if infected X a couple months )       Other   This is a new problem. The problem has been gradually worsening. Associated symptoms include congestion, coughing and a sore throat. Pertinent negatives include no fever.     Patient reports that 2 weeks ago she started develop sinus congestion, sinus pain, nasal congestion, nasal discharge, and right ear pain.  Reports a history of environmental allergies as well as recurrent sinus infections.    Reports that 2 days ago she accidentally bit the right side of her tongue.  Reports that ever since then she has been having increased swelling, tenderness, and pain at the right side of her face and jaw.    Prior therapy: Flonase and acetaminophen.    PMH:  has a past medical history of ASTHMA, Diabetes, Essential hypertension, benign (3/31/2014), Gastroparesis (9/30/2015), Hypertension, and Other and unspecified hyperlipidemia (3/31/2014).    MEDS:   Current Outpatient Medications:   •  tramadol (ULTRAM) 50 MG Tab, Take 1 Tab by mouth every 8 hours as needed for Severe Pain for up to 3 days., Disp: 9 Tab, Rfl: 0  •  amoxicillin-clavulanate (AUGMENTIN) 875-125 MG Tab, Take 1 Tab by mouth 2 times a day for 7 days., Disp: 14 Tab, Rfl: 0  •  Pseudoeph-Doxylamine-DM-APAP (NYQUIL PO), Take  by mouth., Disp: , Rfl:   •  FLUoxetine (PROZAC) 20 MG Cap, Take 20 mg by mouth every day., Disp: , Rfl:   •  telmisartan (MICARDIS) 40 MG Tab, Take 40 mg by mouth every day., Disp: , Rfl:   •  fenofibrate (TRICOR) 145 MG Tab, Take 145 mg by mouth every day., Disp: , Rfl:   •  sitagliptan-metformin (JANUMET)  MG per tablet, Take 1 Tab by mouth 2 times a day, with meals., Disp: , Rfl:   •  fluticasone (FLONASE) 50 MCG/ACT nasal spray, Spray 1 Spray in nose every day., Disp: 1 Bottle, Rfl: 1  •  benzonatate (TESSALON) 100 MG Cap, Take 1 Cap by mouth 3 times a day as  needed for Cough., Disp: 30 Cap, Rfl: 0  •  lisinopril (PRINIVIL) 10 MG Tab, Take 10 mg by mouth every day., Disp: , Rfl:   •  traZODone (DESYREL) 100 MG Tab, Take 100 mg by mouth every evening., Disp: , Rfl:   •  albuterol 108 (90 BASE) MCG/ACT Aero Soln inhalation aerosol, Inhale 2 Puffs by mouth every 6 hours as needed for Shortness of Breath., Disp: 8.5 g, Rfl: 0  •  albuterol (PROVENTIL) 2.5mg/3ml Nebu Soln solution for nebulization, 3 mL by Nebulization route every four hours as needed for Shortness of Breath., Disp: 75 mL, Rfl: 0  •  azithromycin (ZITHROMAX) 250 MG Tab, Take 2 tabs by mouth on day 1, then take 1 tab on days 2-5 (Patient not taking: Reported on 11/16/2019), Disp: 6 Tab, Rfl: 0  •  Hydrocod Polst-CPM Polst ER (TUSSIONEX) 10-8 MG/5ML Suspension Extended Release, Take 5 mL by mouth every 12 hours as needed for Cough. Causes drowsiness, do not drive or work while using (Patient not taking: Reported on 11/16/2019), Disp: 120 mL, Rfl: 0  •  insulin detemir (LEVEMIR FLEXTOUCH) 100 UNIT/ML Solution Pen-injector injection, Inject 80 Units as instructed every evening., Disp: 30 mL, Rfl: 11  •  triamterene-hctz (MAXZIDE-25/DYAZIDE) 37.5-25 MG Tab, Take 1 Tab by mouth every day. (Patient not taking: Reported on 11/16/2019), Disp: 90 Tab, Rfl: 2  •  metformin (GLUCOPHAGE) 500 MG Tab, Take 2 Tabs by mouth every day., Disp: 180 Tab, Rfl: 3  •  Saxagliptin-Metformin (KOMBIGLYZE XR) 5-1000 MG TB24, Take 1 Tab by mouth every day., Disp: 90 Tab, Rfl: 3  •  glimepiride (AMARYL) 4 MG TABS, Take 1 Tab by mouth 2 times a day. (Patient not taking: Reported on 11/16/2019), Disp: 180 Tab, Rfl: 3  •  glucose blood (FREESTYLE LITE) strip, Testing blood sugar 3 times per day., Disp: 100 Strip, Rfl: 6  •  Canagliflozin (INVOKANA) 100 MG TABS, Take 1 Tab by mouth every day. (Patient not taking: Reported on 11/16/2019), Disp: 30 Tab, Rfl: 11  •  losartan (COZAAR) 100 MG TABS, Take 100 mg by mouth every day., Disp: , Rfl:   •  " INJECTION DEVICE FOR INSULIN (PEN NEEDLES 31GX5/16\") MISC, 1 Each by Does not apply route every bedtime., Disp: 100 Each, Rfl: 3  •  metoclopramide (REGLAN) 10 MG TABS, Take 10 mg by mouth 4 times a day. 1 tablet before meals and at bedtime , Disp: , Rfl:   •  rosuvastatin (CRESTOR) 20 MG TABS, Take 20 mg by mouth every evening.  , Disp: , Rfl:   •  potassium chloride CR (K-DUR) 10 MEQ tablet, Take 10 mEq by mouth every day., Disp: , Rfl:   •  Esomeprazole Magnesium (NEXIUM) 20 MG PACK, Take 40 mg by mouth 2 Times a Day., Disp: , Rfl:   •  Choline Fenofibrate (TRILIPIX) 135 MG CPDR, Take  by mouth.  , Disp: , Rfl:   •  vitamin D (CHOLECALCIFEROL) 1000 UNIT TABS, Take 2,000 Units by mouth every day., Disp: , Rfl:   •  albuterol (VENTOLIN OR PROVENTIL) 108 (90 BASE) MCG/ACT AERS, Inhale 2 Puffs by mouth every 6 hours as needed.  , Disp: , Rfl:   •  Pseudoeph-Doxylamine-DM-APAP (NYQUIL PO), Take  by mouth., Disp: , Rfl:   •  AMLODIPINE BESYLATE PO, Take 10 mg by mouth., Disp: , Rfl:     ALLERGIES:   Allergies   Allergen Reactions   • Asa [Aspirin] Hives   • Lovaza [Omega-3-Acid Ethyl Esters] Hives     SURGHX:   Past Surgical History:   Procedure Laterality Date   • GYN SURGERY     • OTHER ORTHOPEDIC SURGERY       SOCHX:  reports that she has been smoking. She has been smoking about 1.00 pack per day. She has never used smokeless tobacco. She reports current alcohol use. She reports that she does not use drugs.     FH: Reviewed with patient, not pertinent to this visit.    Review of Systems   Constitutional: Positive for malaise/fatigue. Negative for fever.   HENT: Positive for congestion, ear pain (Right), sinus pain and sore throat.         Tongue injury, pain, swelling   Respiratory: Positive for cough. Negative for shortness of breath.    Cardiovascular: Negative.    Neurological: Negative.    All other systems reviewed and are negative.  Additional details per HPI.    Objective:     /78   Pulse 74   Temp " "37 °C (98.6 °F)   Resp 16   Ht 1.626 m (5' 4\")   Wt 105.2 kg (232 lb)   SpO2 98%   BMI 39.82 kg/m²     Physical Exam  Vitals signs reviewed.   Constitutional:       General: She is not in acute distress.     Appearance: She is well-developed. She is not toxic-appearing or diaphoretic.   HENT:      Head: Normocephalic.      Right Ear: Tympanic membrane and external ear normal.      Left Ear: Tympanic membrane and external ear normal.      Nose: Mucosal edema and rhinorrhea present.      Right Sinus: Maxillary sinus tenderness and frontal sinus tenderness present.      Mouth/Throat:      Mouth: Mucous membranes are moist.      Tongue: Lesions present.      Pharynx: Oropharynx is clear. Uvula midline.      Comments: Small ulceration with surrounding erythema and edema at right side of tongue  Eyes:      Extraocular Movements: Extraocular movements intact.      Conjunctiva/sclera: Conjunctivae normal.      Pupils: Pupils are equal, round, and reactive to light.   Neck:      Musculoskeletal: Normal range of motion.   Cardiovascular:      Rate and Rhythm: Normal rate and regular rhythm.      Pulses: Normal pulses.      Heart sounds: Normal heart sounds.   Pulmonary:      Effort: Pulmonary effort is normal. No respiratory distress.      Breath sounds: Normal breath sounds. No decreased breath sounds.   Abdominal:      General: Bowel sounds are normal.      Palpations: Abdomen is soft.      Tenderness: There is no tenderness.   Musculoskeletal: Normal range of motion.         General: No deformity.   Skin:     General: Skin is warm and dry.      Capillary Refill: Capillary refill takes less than 2 seconds.      Coloration: Skin is not pale.   Neurological:      Mental Status: She is alert and oriented to person, place, and time.      Sensory: No sensory deficit.      Coordination: Coordination normal.   Psychiatric:         Behavior: Behavior normal. Behavior is cooperative.          Assessment/Plan:     1. Acute " non-recurrent sinusitis, unspecified location  - amoxicillin-clavulanate (AUGMENTIN) 875-125 MG Tab; Take 1 Tab by mouth 2 times a day for 7 days.  Dispense: 14 Tab; Refill: 0    2. Oral infection  - amoxicillin-clavulanate (AUGMENTIN) 875-125 MG Tab; Take 1 Tab by mouth 2 times a day for 7 days.  Dispense: 14 Tab; Refill: 0    3. Tongue injury, initial encounter    4. Tongue pain  - tramadol (ULTRAM) 50 MG Tab; Take 1 Tab by mouth every 8 hours as needed for Severe Pain for up to 3 days.  Dispense: 9 Tab; Refill: 0    Other orders  - Consent for Opiate Prescription    Rx as above.    Discussed OTC decongestants (e.g. Sudafed), antihistamines, Flonase, and nasal saline rinses/neti pot.     Discussed close monitoring and supportive measures including increasing fluids and rest as well as OTC symptom management per 's instructions.    Patient requesting stronger pain medication to help her with her acute tongue pain and swelling.  Reports that she cannot take NSAIDs as she was advised by her PCP not to do so for potential kidney problem.  Reports that acetaminophen alone has not been sufficient for pain relief.    [employer] and [place]  inquiries and Opioid Risk Tool show no increased risk of controlled substance abuse for this patient. Patient counseled on risks and benefits of opioids. Advised of potential sedating effects of this medication. No use while at work or while driving.  Written consent received for short-term prescription of controlled substance for adequate control of pain.    Vital signs stable, afebrile, asymptomatic, no acute distress.    Warning signs reviewed. Return precautions discussed.     Patient advised to: Return for 1) Symptoms don't improve or worsen, or go to ER, 2) Follow up with primary care in 7-10 days.    Differential diagnosis, natural history, supportive care, and indications for immediate follow-up discussed. All questions answered. Patient agrees with the plan of  care.

## 2020-03-05 ENCOUNTER — APPOINTMENT (OUTPATIENT)
Dept: RADIOLOGY | Facility: IMAGING CENTER | Age: 60
End: 2020-03-05
Attending: NURSE PRACTITIONER
Payer: COMMERCIAL

## 2020-03-05 ENCOUNTER — OFFICE VISIT (OUTPATIENT)
Dept: URGENT CARE | Facility: CLINIC | Age: 60
End: 2020-03-05
Payer: COMMERCIAL

## 2020-03-05 VITALS
SYSTOLIC BLOOD PRESSURE: 142 MMHG | TEMPERATURE: 97.6 F | BODY MASS INDEX: 39.03 KG/M2 | DIASTOLIC BLOOD PRESSURE: 68 MMHG | WEIGHT: 228.6 LBS | HEIGHT: 64 IN | RESPIRATION RATE: 14 BRPM | OXYGEN SATURATION: 92 % | HEART RATE: 92 BPM

## 2020-03-05 DIAGNOSIS — J22 LRTI (LOWER RESPIRATORY TRACT INFECTION): ICD-10-CM

## 2020-03-05 DIAGNOSIS — R05.9 COUGH: ICD-10-CM

## 2020-03-05 LAB
FLUAV+FLUBV AG SPEC QL IA: NEGATIVE
INT CON NEG: NEGATIVE
INT CON NEG: NEGATIVE
INT CON POS: POSITIVE
INT CON POS: POSITIVE
S PYO AG THROAT QL: NEGATIVE

## 2020-03-05 PROCEDURE — 99214 OFFICE O/P EST MOD 30 MIN: CPT | Mod: 25 | Performed by: NURSE PRACTITIONER

## 2020-03-05 PROCEDURE — 71046 X-RAY EXAM CHEST 2 VIEWS: CPT | Mod: TC | Performed by: NURSE PRACTITIONER

## 2020-03-05 PROCEDURE — 87880 STREP A ASSAY W/OPTIC: CPT | Performed by: NURSE PRACTITIONER

## 2020-03-05 PROCEDURE — 94640 AIRWAY INHALATION TREATMENT: CPT | Performed by: NURSE PRACTITIONER

## 2020-03-05 PROCEDURE — 87804 INFLUENZA ASSAY W/OPTIC: CPT | Performed by: NURSE PRACTITIONER

## 2020-03-05 RX ORDER — DOXYCYCLINE HYCLATE 100 MG
100 TABLET ORAL 2 TIMES DAILY
Qty: 14 TAB | Refills: 0 | Status: SHIPPED | OUTPATIENT
Start: 2020-03-05 | End: 2020-03-12

## 2020-03-05 RX ORDER — ALBUTEROL SULFATE 2.5 MG/3ML
2.5 SOLUTION RESPIRATORY (INHALATION) EVERY 4 HOURS PRN
Qty: 30 BULLET | Refills: 0 | Status: SHIPPED | OUTPATIENT
Start: 2020-03-05

## 2020-03-05 RX ORDER — AMOXICILLIN AND CLAVULANATE POTASSIUM 875; 125 MG/1; MG/1
1 TABLET, FILM COATED ORAL 2 TIMES DAILY
Qty: 14 TAB | Refills: 0 | Status: SHIPPED | OUTPATIENT
Start: 2020-03-05 | End: 2020-03-12

## 2020-03-05 RX ORDER — DEXTROMETHORPHAN HYDROBROMIDE AND PROMETHAZINE HYDROCHLORIDE 15; 6.25 MG/5ML; MG/5ML
5 SYRUP ORAL EVERY 4 HOURS PRN
Qty: 120 ML | Refills: 0 | Status: SHIPPED | OUTPATIENT
Start: 2020-03-05

## 2020-03-05 RX ORDER — PREDNISONE 10 MG/1
40 TABLET ORAL DAILY
Qty: 20 TAB | Refills: 0 | Status: CANCELLED | OUTPATIENT
Start: 2020-03-05 | End: 2020-03-10

## 2020-03-05 RX ORDER — PREDNISONE 10 MG/1
40 TABLET ORAL DAILY
Qty: 20 TAB | Refills: 0 | Status: SHIPPED | OUTPATIENT
Start: 2020-03-05 | End: 2020-03-10

## 2020-03-05 RX ORDER — IPRATROPIUM BROMIDE AND ALBUTEROL SULFATE 2.5; .5 MG/3ML; MG/3ML
3 SOLUTION RESPIRATORY (INHALATION) ONCE
Status: COMPLETED | OUTPATIENT
Start: 2020-03-05 | End: 2020-03-05

## 2020-03-05 RX ORDER — DOXYCYCLINE HYCLATE 100 MG
100 TABLET ORAL 2 TIMES DAILY
Qty: 14 TAB | Refills: 0 | Status: CANCELLED | OUTPATIENT
Start: 2020-03-05 | End: 2020-03-12

## 2020-03-05 RX ADMIN — IPRATROPIUM BROMIDE AND ALBUTEROL SULFATE 3 ML: 2.5; .5 SOLUTION RESPIRATORY (INHALATION) at 22:51

## 2020-03-05 ASSESSMENT — ENCOUNTER SYMPTOMS
EYE PAIN: 0
SORE THROAT: 1
WHEEZING: 1
RHINORRHEA: 1
FEVER: 1
SHORTNESS OF BREATH: 1
NAUSEA: 0
CHILLS: 1
VOMITING: 0
DIZZINESS: 0
COUGH: 1
HEADACHES: 1
MYALGIAS: 1
SPUTUM PRODUCTION: 1

## 2020-03-05 ASSESSMENT — COPD QUESTIONNAIRES: COPD: 0

## 2020-03-06 NOTE — PROGRESS NOTES
"Subjective:   Marina Medina  is a 60 y.o. female who presents for Sinus Pain (x 5 days.  Pt. complains of sinus pain, sinus/chest congestion, cough, sore throat, L side earache, headache and fever. Pt. states when she coughs she has pain in abdomen and back.  Has a Hx of Asthma.)        Cough   This is a new problem. The current episode started in the past 7 days. The problem has been gradually worsening. The problem occurs constantly. The cough is productive of sputum. Associated symptoms include chills, a fever, headaches, myalgias, nasal congestion, postnasal drip, rhinorrhea, a sore throat, shortness of breath and wheezing. Pertinent negatives include no chest pain, ear pain or rash. Nothing aggravates the symptoms. Risk factors for lung disease include smoking/tobacco exposure. She has tried OTC cough suppressant for the symptoms. The treatment provided no relief. Her past medical history is significant for asthma and bronchitis. There is no history of COPD or pneumonia.     Review of Systems   Constitutional: Positive for chills, fever and malaise/fatigue.   HENT: Positive for congestion, postnasal drip, rhinorrhea and sore throat. Negative for ear pain.    Eyes: Negative for pain.   Respiratory: Positive for cough, sputum production, shortness of breath and wheezing.    Cardiovascular: Negative for chest pain.   Gastrointestinal: Negative for nausea and vomiting.   Genitourinary: Negative for hematuria.   Musculoskeletal: Positive for myalgias.   Skin: Negative for rash.   Neurological: Positive for headaches. Negative for dizziness.     Allergies   Allergen Reactions   • Asa [Aspirin] Hives   • Lovaza [Omega-3-Acid Ethyl Esters] Hives      Objective:   /68   Pulse 92   Temp 36.4 °C (97.6 °F) (Temporal)   Resp 14   Ht 1.626 m (5' 4\")   Wt 103.7 kg (228 lb 9.6 oz)   SpO2 92%   BMI 39.24 kg/m²   Physical Exam  Vitals signs and nursing note reviewed.   Constitutional:       General: She is not " in acute distress.     Appearance: She is well-developed.   HENT:      Head: Normocephalic and atraumatic.      Right Ear: Tympanic membrane and external ear normal.      Left Ear: Tympanic membrane and external ear normal.      Nose: Nose normal.      Right Sinus: No maxillary sinus tenderness or frontal sinus tenderness.      Left Sinus: No maxillary sinus tenderness or frontal sinus tenderness.      Mouth/Throat:      Mouth: Mucous membranes are moist.      Pharynx: Uvula midline. Posterior oropharyngeal erythema present.      Tonsils: No tonsillar exudate or tonsillar abscesses.   Eyes:      General:         Right eye: No discharge.         Left eye: No discharge.      Conjunctiva/sclera: Conjunctivae normal.      Pupils: Pupils are equal, round, and reactive to light.   Cardiovascular:      Rate and Rhythm: Normal rate and regular rhythm.      Heart sounds: No murmur.   Pulmonary:      Effort: Pulmonary effort is normal. No respiratory distress.      Breath sounds: Wheezing and rhonchi present.   Abdominal:      General: There is no distension.      Palpations: Abdomen is soft.      Tenderness: There is no abdominal tenderness.   Musculoskeletal: Normal range of motion.   Skin:     General: Skin is warm and dry.   Neurological:      General: No focal deficit present.      Mental Status: She is alert and oriented to person, place, and time. Mental status is at baseline.      Gait: Gait (gait at baseline) normal.   Psychiatric:         Judgment: Judgment normal.           Assessment/Plan:     1. Cough  DX-CHEST-2 VIEWS    POCT Rapid Strep A    POCT Influenza A/B    ipratropium-albuterol (DUONEB) nebulizer solution    amoxicillin-clavulanate (AUGMENTIN) 875-125 MG Tab    REFERRAL TO PULMONOLOGY    predniSONE (DELTASONE) 10 MG Tab    doxycycline (VIBRAMYCIN) 100 MG Tab    promethazine-dextromethorphan (PROMETHAZINE-DM) 6.25-15 MG/5ML syrup    albuterol (PROVENTIL) 2.5mg/3ml Nebu Soln solution for nebulization   2.  LRTI (lower respiratory tract infection)  amoxicillin-clavulanate (AUGMENTIN) 875-125 MG Tab    REFERRAL TO PULMONOLOGY    predniSONE (DELTASONE) 10 MG Tab    doxycycline (VIBRAMYCIN) 100 MG Tab    promethazine-dextromethorphan (PROMETHAZINE-DM) 6.25-15 MG/5ML syrup    albuterol (PROVENTIL) 2.5mg/3ml Nebu Soln solution for nebulization       Xray results  1.  Mildly prominent interstitium may indicate interstitial lung disease or edema.  2.  No lobar pneumonia or pneumothorax.      Advised to continue supportive care with Tylenol and/or ibuprofen for fevers and discomfort. Increased fluids and electrolytes.  Will start patient on antibiotics for suspected bacterial etiology.  Patient and/or guardian given precautionary s/sx that mandate immediate follow up and evaluation in the ED. Advised of risks of not doing so.  Side effects of the above medications discussed.   DDX, Supportive care, and indications for immediate follow-up discussed with patient.    Instructed to return to clinic or nearest emergency department if we are not available for any change in condition, further concerns, or worsening of symptoms.    The patient and/or guardian demonstrated a good understanding and agreed with the treatment plan.    Please note that this dictation was created using voice recognition software. I have made every reasonable attempt to correct obvious errors, but I expect that there are errors of grammar and possibly content that I did not discover before finalizing the note.

## 2020-12-15 ENCOUNTER — HOSPITAL ENCOUNTER (EMERGENCY)
Facility: MEDICAL CENTER | Age: 60
End: 2020-12-15
Attending: EMERGENCY MEDICINE
Payer: COMMERCIAL

## 2020-12-15 VITALS
WEIGHT: 220.46 LBS | SYSTOLIC BLOOD PRESSURE: 169 MMHG | HEART RATE: 65 BPM | HEIGHT: 64 IN | BODY MASS INDEX: 37.64 KG/M2 | RESPIRATION RATE: 16 BRPM | TEMPERATURE: 97.4 F | OXYGEN SATURATION: 96 % | DIASTOLIC BLOOD PRESSURE: 85 MMHG

## 2020-12-15 DIAGNOSIS — J01.10 ACUTE FRONTAL SINUSITIS, RECURRENCE NOT SPECIFIED: ICD-10-CM

## 2020-12-15 DIAGNOSIS — Z20.822 SUSPECTED COVID-19 VIRUS INFECTION: ICD-10-CM

## 2020-12-15 LAB
COVID ORDER STATUS COVID19: NORMAL
SARS-COV-2 RNA RESP QL NAA+PROBE: NOTDETECTED
SPECIMEN SOURCE: NORMAL

## 2020-12-15 PROCEDURE — U0003 INFECTIOUS AGENT DETECTION BY NUCLEIC ACID (DNA OR RNA); SEVERE ACUTE RESPIRATORY SYNDROME CORONAVIRUS 2 (SARS-COV-2) (CORONAVIRUS DISEASE [COVID-19]), AMPLIFIED PROBE TECHNIQUE, MAKING USE OF HIGH THROUGHPUT TECHNOLOGIES AS DESCRIBED BY CMS-2020-01-R: HCPCS

## 2020-12-15 PROCEDURE — A9270 NON-COVERED ITEM OR SERVICE: HCPCS | Performed by: EMERGENCY MEDICINE

## 2020-12-15 PROCEDURE — C9803 HOPD COVID-19 SPEC COLLECT: HCPCS | Performed by: EMERGENCY MEDICINE

## 2020-12-15 PROCEDURE — 99283 EMERGENCY DEPT VISIT LOW MDM: CPT

## 2020-12-15 PROCEDURE — 700102 HCHG RX REV CODE 250 W/ 637 OVERRIDE(OP): Performed by: EMERGENCY MEDICINE

## 2020-12-15 RX ORDER — AZITHROMYCIN 250 MG/1
TABLET, FILM COATED ORAL
Qty: 6 TAB | Refills: 0 | Status: SHIPPED | OUTPATIENT
Start: 2020-12-15

## 2020-12-15 RX ORDER — ALBUTEROL SULFATE 90 UG/1
2 AEROSOL, METERED RESPIRATORY (INHALATION) EVERY 6 HOURS PRN
Qty: 8.5 G | Refills: 1 | Status: SHIPPED | OUTPATIENT
Start: 2020-12-15

## 2020-12-15 RX ORDER — AZITHROMYCIN 250 MG/1
500 TABLET, FILM COATED ORAL ONCE
Status: COMPLETED | OUTPATIENT
Start: 2020-12-15 | End: 2020-12-15

## 2020-12-15 RX ADMIN — AZITHROMYCIN MONOHYDRATE 500 MG: 250 TABLET ORAL at 14:07

## 2020-12-15 NOTE — ED NOTES
Agree with triage note.  Pt is alert and oriented, speaking in full sentences, follows commands and responds appropriately to questions. NAD. Resp are even and unlabored.     Patient and staff wearing appropriate PPE    Patient medicated per MAR, covid swab complete.

## 2020-12-15 NOTE — ED TRIAGE NOTES
.Marina Medina  .  Chief Complaint   Patient presents with   • Shortness of Breath     x 10 days   • Cough     x 10 days   • Ear Pain     x 10 days   • Sinus Pain     x 10 days   • Flu Like Symptoms     x 10 days   • Coronavirus Screening     Patient to triage with above complaint.   aao x 4. Neuro intact.   Speaking in full sentences.   Mask in place.   Patient to senior mariellaunge and instructed to inform staff of any needs.

## 2020-12-15 NOTE — ED NOTES
The patient has been provided with discharge education and information.  The patient was also provided with instructions on follow up care and return precautions.  The patient verbalizes understanding of discharge instructions, follow up care, and return precautions.  All questions have been answered.  two RX written by SHAHAB.  NAD, A/Annette, good color and appropriate at time of discharge.  Patient ambulated steady gait out of department.

## 2020-12-16 NOTE — ED PROVIDER NOTES
ED Provider Note    CHIEF COMPLAINT  Chief Complaint   Patient presents with   • Shortness of Breath     x 10 days   • Cough     x 10 days   • Ear Pain     x 10 days   • Sinus Pain     x 10 days   • Flu Like Symptoms     x 10 days   • Coronavirus Screening       HPI  Marnia Medina is a 60 y.o. female who presents complaints of cough for 10 days with shortness of breath, left ear and sinus pain with muscle aches.  Patient states she recently traveled from [place] when the symptoms started approximately 10 days ago she has had no known Covid exposure or risks.  Denies chest pain no fever but has had chills.  Came in today to get tested for Covid.    REVIEW OF SYSTEMS  See HPI for further details. All other systems are negative.      PAST MEDICAL HISTORY  Past Medical History:   Diagnosis Date   • Gastroparesis 9/30/2015   • Other and unspecified hyperlipidemia 3/31/2014   • Essential hypertension, benign 3/31/2014   • ASTHMA    • Diabetes    • Hypertension        FAMILY HISTORY  History reviewed. No pertinent family history.    SOCIAL HISTORY  Social History     Socioeconomic History   • Marital status:      Spouse name: Not on file   • Number of children: Not on file   • Years of education: Not on file   • Highest education level: Not on file   Occupational History   • Not on file   Social Needs   • Financial resource strain: Not on file   • Food insecurity     Worry: Not on file     Inability: Not on file   • Transportation needs     Medical: Not on file     Non-medical: Not on file   Tobacco Use   • Smoking status: Current Every Day Smoker     Packs/day: 1.00     Types: Cigarettes   • Smokeless tobacco: Never Used   • Tobacco comment: 1/2 PACK/DAY   Substance and Sexual Activity   • Alcohol use: Yes     Comment: RARELY   • Drug use: No   • Sexual activity: Not on file   Lifestyle   • Physical activity     Days per week: Not on file     Minutes per session: Not on file   • Stress: Not on file  "  Relationships   • Social connections     Talks on phone: Not on file     Gets together: Not on file     Attends [employer] service: Not on file     Active member of club or organization: Not on file     Attends meetings of clubs or organizations: Not on file     Relationship status: Not on file   • Intimate partner violence     Fear of current or ex partner: Not on file     Emotionally abused: Not on file     Physically abused: Not on file     Forced sexual activity: Not on file   Other Topics Concern   • Not on file   Social History Narrative   • Not on file       SURGICAL HISTORY  Past Surgical History:   Procedure Laterality Date   • GYN SURGERY     • OTHER ORTHOPEDIC SURGERY         CURRENT MEDICATIONS  Home Medications     Reviewed by Jazmyne Cortés R.N. (Registered Nurse) on 12/15/20 at 1118  Med List Status: Partial   Medication Last Dose Status   albuterol (PROVENTIL) 2.5mg/3ml Nebu Soln solution for nebulization  Active   albuterol (PROVENTIL) 2.5mg/3ml Nebu Soln solution for nebulization  Active   albuterol (VENTOLIN OR PROVENTIL) 108 (90 BASE) MCG/ACT AERS  Active   albuterol 108 (90 BASE) MCG/ACT Aero Soln inhalation aerosol  Active   AMLODIPINE BESYLATE PO  Active   azithromycin (ZITHROMAX) 250 MG Tab  Active   benzonatate (TESSALON) 100 MG Cap  Active   Canagliflozin (INVOKANA) 100 MG TABS  Active   Choline Fenofibrate (TRILIPIX) 135 MG CPDR  Active   Esomeprazole Magnesium (NEXIUM) 20 MG PACK  Active   fenofibrate (TRICOR) 145 MG Tab  Active   FLUoxetine (PROZAC) 20 MG Cap  Active   fluticasone (FLONASE) 50 MCG/ACT nasal spray  Active   glimepiride (AMARYL) 4 MG TABS  Active   glucose blood (FREESTYLE LITE) strip  Active   Hydrocod Polst-CPM Polst ER (TUSSIONEX) 10-8 MG/5ML Suspension Extended Release  Active   INJECTION DEVICE FOR INSULIN (PEN NEEDLES 31GX5/16\") MISC  Active   insulin detemir (LEVEMIR FLEXTOUCH) 100 UNIT/ML Solution Pen-injector injection  Active   lisinopril (PRINIVIL) 10 MG Tab " " Active   losartan (COZAAR) 100 MG TABS  Active   metformin (GLUCOPHAGE) 500 MG Tab  Active   metoclopramide (REGLAN) 10 MG TABS  Active   potassium chloride CR (K-DUR) 10 MEQ tablet  Active   promethazine-dextromethorphan (PROMETHAZINE-DM) 6.25-15 MG/5ML syrup  Active   Pseudoeph-Doxylamine-DM-APAP (NYQUIL PO)  Active   Pseudoeph-Doxylamine-DM-APAP (NYQUIL PO)  Active   rosuvastatin (CRESTOR) 20 MG TABS  Active   Saxagliptin-Metformin (KOMBIGLYZE XR) 5-1000 MG TB24  Active   sitagliptan-metformin (JANUMET)  MG per tablet  Active   telmisartan (MICARDIS) 40 MG Tab  Active   traZODone (DESYREL) 100 MG Tab  Active   triamterene-hctz (MAXZIDE-25/DYAZIDE) 37.5-25 MG Tab  Active   vitamin D (CHOLECALCIFEROL) 1000 UNIT TABS  Active                ALLERGIES  Allergies   Allergen Reactions   • Asa [Aspirin] Hives   • Lovaza [Omega-3-Acid Ethyl Esters] Hives       PHYSICAL EXAM  VITAL SIGNS: BP (!) 169/85   Pulse 65   Temp 36.3 °C (97.4 °F) (Temporal)   Resp 16   Ht 1.626 m (5' 4\")   Wt 100 kg (220 lb 7.4 oz)   LMP 10/17/2009   SpO2 96%   BMI 37.84 kg/m²       Constitutional :  Well developed, Well nourished, No acute distress, Non-toxic appearance.   HENT: Tenderness to percussion over the frontal sinus increased on the right.  TMs are intact translucent is bilateral tympanic sclerosis noted  Eyes: perrla  Neck: Normal range of motion, No tenderness, Supple, No stridor.   Lymphatic: Submandibular lymphadenopathy.   Cardiovascular: Normal heart rate, Normal rhythm, No murmurs, No rubs, No gallops.   Thorax & Lungs: Clear to auscultation all lung fields no rales rhonchi or wheezing  Skin: Warm, Dry, No erythema, No rash.   Extremities: Intact distal pulses, No edema, No tenderness, No cyanosis, No clubbing.       RADIOLOGY/PROCEDURES  No orders to display         COURSE & MEDICAL DECISION MAKING  Pertinent Labs & Imaging studies reviewed. (See chart for details)  Covid test was obtained patient understands that " this may take 24 to 48 hours and to obtain results she should sign into Mychart he was given instructions on how to do this.  Also placed on quarantine/isolation instructions per to CDC guidelines and given instructions for this.  She does have some sinus tenderness and infection she was placed on zithromax given first dose here in the emergency room return if increasing shortness of breath, fever worsening or persistent symptoms next 2448 hrs. otherwise follow-up with primary care physician she verbalizes understanding above instructions and need for follow-up.  Discussed using vitamin C, D and zinc.  Placed on albuterol inhaler.  Discharged in stable condition as above to home    FINAL IMPRESSION  1.  Suspected Covid illness  2.  Sinusitis  3.      Electronically signed by: Sav Canas D.O., 12/15/2020

## 2021-03-15 DIAGNOSIS — Z23 NEED FOR VACCINATION: ICD-10-CM
